# Patient Record
(demographics unavailable — no encounter records)

---

## 2024-11-24 NOTE — PHYSICAL EXAM
[de-identified] : Patient is well nourished, well-developed, in no acute distress, with appropriate mood and affect. The patient is oriented to time, place, and person. Respirations are even and unlabored. Gait evaluation does reveal a limp. There is no inguinal adenopathy. Bilateral limbs are well-perfused, without skin lesions, shows a grossly normal motor and sensory examination. The right knee motion is significantly reduced and does cause significant pain. The right knee moves from 0 to 125 degrees. The knee is stable within that range-of-motion to AP and ML stress. The alignment of the knee is neutral. Muscle strength is normal. Pedal pulses are palpable. Hip examination was negative. The left knee motion is significantly reduced and does cause significant pain. The left knee moves from 0 to 120 degrees. The knee is stable within that range-of-motion to AP and ML stress. The alignment of the knee is 10 degrees valgus. Muscle strength is normal. Pedal pulses are palpable. Hip examination was negative. [de-identified] : AP, lateral, sunrise knee x-rays of the right knee were ordered and obtained in the office and demonstrate satisfactory position and alignment of the components are present. No signs of loosening are seen.   Long standing  knee, AP knee, lateral knee, tunnel and patellar views of the left knee were ordered and taken in the office and demonstrate degenerative joint disease of the knee with joint space narrowing, osteophyte formation, and subchondral sclerosis.

## 2024-11-24 NOTE — DISCUSSION/SUMMARY
[de-identified] : This patient has left knee osteoarthritis and is fully recovered now from a right total knee arthroplasty that required manipulation under anesthesia.  The patient is not an appropriate candidate for surgical intervention at this time. An extensive discussion was conducted on the natural history of the disease and the variety of surgical and non-surgical options available to the patient including, but not limited to non-steroidal anti-inflammatory medications, steroid injections, physical therapy, maintenance of ideal body weight, and reduction of activity.  Today we performed a left knee intra-articular cortisone injection.  The patient will schedule an appointment as needed.  Informed consent for the left knee injection was obtained. All risks, benefits and alternatives were discussed. These included but were not limited to bleeding, infection, and allergic reaction.  All questions were answered. A time out was performed. The left knee was prepped and draped in sterile fashion. Using sterile technique, the left knee was injected with 80mg of Kenalog, 4cc of 1% lidocaine, 4cc of 0.25% marcaine using a 21-gauge needle. A sterile dressing was applied. Post injection instructions were reviewed. The patient tolerated the procedure well.

## 2024-11-24 NOTE — HISTORY OF PRESENT ILLNESS
[de-identified] : This is very nice 60-year-old gentleman here for follow-up of his right total knee arthroplasty and left knee mild osteoarthritis.  He received a cortisone injection into the left knee during his last visit and is doing very well today. He has known left knee osteoarthritis.  He is also status post right total knee arthroplasty and manipulation under anesthesia for arthrofibrosis.  Pain now much improved and he is happy with ghis progress.

## 2024-11-24 NOTE — DISCUSSION/SUMMARY
[de-identified] : This patient has left knee osteoarthritis and is fully recovered now from a right total knee arthroplasty that required manipulation under anesthesia.  The patient is not an appropriate candidate for surgical intervention at this time. An extensive discussion was conducted on the natural history of the disease and the variety of surgical and non-surgical options available to the patient including, but not limited to non-steroidal anti-inflammatory medications, steroid injections, physical therapy, maintenance of ideal body weight, and reduction of activity.  Today we performed a left knee intra-articular cortisone injection.  The patient will schedule an appointment as needed.  Informed consent for the left knee injection was obtained. All risks, benefits and alternatives were discussed. These included but were not limited to bleeding, infection, and allergic reaction.  All questions were answered. A time out was performed. The left knee was prepped and draped in sterile fashion. Using sterile technique, the left knee was injected with 80mg of Kenalog, 4cc of 1% lidocaine, 4cc of 0.25% marcaine using a 21-gauge needle. A sterile dressing was applied. Post injection instructions were reviewed. The patient tolerated the procedure well.

## 2024-11-24 NOTE — PHYSICAL EXAM
[de-identified] : Patient is well nourished, well-developed, in no acute distress, with appropriate mood and affect. The patient is oriented to time, place, and person. Respirations are even and unlabored. Gait evaluation does reveal a limp. There is no inguinal adenopathy. Bilateral limbs are well-perfused, without skin lesions, shows a grossly normal motor and sensory examination. The right knee motion is significantly reduced and does cause significant pain. The right knee moves from 0 to 125 degrees. The knee is stable within that range-of-motion to AP and ML stress. The alignment of the knee is neutral. Muscle strength is normal. Pedal pulses are palpable. Hip examination was negative. The left knee motion is significantly reduced and does cause significant pain. The left knee moves from 0 to 120 degrees. The knee is stable within that range-of-motion to AP and ML stress. The alignment of the knee is 10 degrees valgus. Muscle strength is normal. Pedal pulses are palpable. Hip examination was negative. [de-identified] : AP, lateral, sunrise knee x-rays of the right knee were ordered and obtained in the office and demonstrate satisfactory position and alignment of the components are present. No signs of loosening are seen.   Long standing  knee, AP knee, lateral knee, tunnel and patellar views of the left knee were ordered and taken in the office and demonstrate degenerative joint disease of the knee with joint space narrowing, osteophyte formation, and subchondral sclerosis.

## 2025-05-21 NOTE — ASSESSMENT
[FreeTextEntry1] : 1- psa and urine- stable and negative respectively 2- can follow as needed - but cont annual PSA wiht PCP or

## 2025-05-21 NOTE — HISTORY OF PRESENT ILLNESS
[FreeTextEntry1] : Patient last seen may 2022: patient with hx of psa 3.28 and bx ( neg) aug 2021 since then psa harika to 7 in dec and then rtc due to rise in psa again Jan 2022 to psa in may 2022 - repeated and normal at 3-  April 2024: psa normal urine clear creat normal nocturia and bph with hx of DM UROFLOW today : voided 177 ml in MF of 10 ml /sec bell pattern but prolonged at 84 sec and no PVR 1- cont annual f/u.   Here for f/u  PSA april 22, 2025: 1.36- stable , Urine clear ( labs brought in to review) nomeds for LUTS good flow , no strain, feels empty after void, noctuira 1 x , no hematuria , no dysuria no FH of Pca